# Patient Record
Sex: FEMALE | Race: WHITE | Employment: FULL TIME | URBAN - NONMETROPOLITAN AREA
[De-identification: names, ages, dates, MRNs, and addresses within clinical notes are randomized per-mention and may not be internally consistent; named-entity substitution may affect disease eponyms.]

---

## 2018-12-11 ENCOUNTER — NURSE ONLY (OUTPATIENT)
Dept: LAB | Age: 33
End: 2018-12-11
Payer: COMMERCIAL

## 2018-12-11 DIAGNOSIS — Z23 NEED FOR VACCINATION: Primary | ICD-10-CM

## 2018-12-11 PROCEDURE — 90471 IMMUNIZATION ADMIN: CPT | Performed by: FAMILY MEDICINE

## 2018-12-11 PROCEDURE — 90686 IIV4 VACC NO PRSV 0.5 ML IM: CPT | Performed by: FAMILY MEDICINE

## 2018-12-11 NOTE — PROGRESS NOTES
Have you had an allergic reaction to the flu (influenza) shot? no  Are you allergic to eggs or any component of the flu vaccine? no  Do you have a history of Guillain-Barnard Syndrome (GBS), which is paralysis after receiving the flu vaccine? no  Are you feeling well today? yes  Flu vaccine given as ordered. Patient tolerated it well. No questions re: VIS information.

## 2024-11-22 NOTE — PROGRESS NOTES
Medications listed   Nutroful 1x daily  Magnesium break through 500mg 1x daily  B6 2mg 1x daily  Manganese 1mg 1x daily

## 2025-01-08 ENCOUNTER — OFFICE VISIT (OUTPATIENT)
Age: 40
End: 2025-01-08

## 2025-01-08 VITALS
OXYGEN SATURATION: 99 % | DIASTOLIC BLOOD PRESSURE: 63 MMHG | BODY MASS INDEX: 20.83 KG/M2 | WEIGHT: 125 LBS | HEIGHT: 65 IN | HEART RATE: 70 BPM | SYSTOLIC BLOOD PRESSURE: 116 MMHG

## 2025-01-08 DIAGNOSIS — N39.46 URINARY INCONTINENCE, MIXED: ICD-10-CM

## 2025-01-08 DIAGNOSIS — R32 URINARY INCONTINENCE, UNSPECIFIED TYPE: Primary | ICD-10-CM

## 2025-01-08 DIAGNOSIS — R33.9 RETENTION OF URINE: ICD-10-CM

## 2025-01-08 DIAGNOSIS — R15.1 FECAL SMEARING: ICD-10-CM

## 2025-01-08 LAB
BILIRUBIN, POC: NORMAL
BLOOD URINE, POC: NORMAL
CLARITY, POC: NORMAL
COLOR, POC: NORMAL
GLUCOSE URINE, POC: NORMAL MG/DL
KETONES, POC: NORMAL MG/DL
LEUKOCYTE EST, POC: NORMAL
NITRITE, POC: NORMAL
PH, POC: 7
PROTEIN, POC: NORMAL MG/DL
SPECIFIC GRAVITY, POC: 1.01
UROBILINOGEN, POC: NORMAL MG/DL

## 2025-01-08 RX ORDER — UBIDECARENONE 75 MG
50 CAPSULE ORAL DAILY
COMMUNITY

## 2025-01-08 NOTE — PROGRESS NOTES
How long have you had urinary incontinence: 7 year(s)    Do you wear pads: yes  Pad Size: large  How often do you have to change the pad: 2-4    Are you having incontinence w/:cough, laugh, sneeze, run, jump  Are you having incontinence with urge:yes  Have you ever done PFT: yes    Any H/O of UTI's: no  If yes, were there cultures done: no    Have you ever had any previous bladder surgeries:no   
necessary.      ROS:  Review of Systems   Genitourinary:  Positive for frequency and urgency.        Mixed UI       PHYSICAL EXAM:  /63 (Site: Right Upper Arm, Position: Sitting, Cuff Size: Medium Adult)   Pulse 70   Ht 1.651 m (5' 5\")   Wt 56.7 kg (125 lb)   SpO2 99%   BMI 20.80 kg/m²     Physical Exam  Constitutional:       Appearance: Normal appearance. She is normal weight.   Genitourinary:      Bladder and urethral meatus normal.      No lesions in the vagina.      Right Labia: No rash, tenderness, lesions or skin changes.     Left Labia: No tenderness, lesions, skin changes or rash.     Vaginal cuff intact.     No vaginal tenderness.      No vaginal prolapse present.     No vaginal atrophy present.       Right Adnexa: not tender and no mass present.     Left Adnexa: not tender and no mass present.     No cervical lesion.      Uterus is not enlarged or tender.      No uterine mass detected.     No urethral tenderness or mass present.      Pelvic Floor: Levator muscle strength is 1/5.     Pelvic Floor comments: Pelvic cavity: Transvaginal digital palpation and Kegel's squeeze palpable.  Myalgia and myositis of the pelvic floor not present to contra-indicate a procedure.  No significant complications or exposures of any prior placed mesh.  No significant pelvic organ prolapse contraindicating a procedure..  Rectum:      Abnormal anal tone present.      No rectal mass.   HENT:      Head: Normocephalic and atraumatic.      Nose: Nose normal.      Mouth/Throat:      Mouth: Mucous membranes are moist.      Pharynx: Oropharynx is clear.   Eyes:      Extraocular Movements: Extraocular movements intact.      Conjunctiva/sclera: Conjunctivae normal.   Cardiovascular:      Rate and Rhythm: Normal rate and regular rhythm.   Pulmonary:      Effort: Pulmonary effort is normal.      Breath sounds: Normal breath sounds.   Abdominal:      General: Abdomen is flat. Bowel sounds are normal.      Hernia: There is no

## 2025-01-08 NOTE — PATIENT INSTRUCTIONS
incontinence.    Many women with urinary incontinence can decrease urinary leakage by exercising their pelvic floor muscles (the area around the vagina, urinary opening, and rectum). This exercise, commonly known as Kegel exercise, involves prince or squeezing and then releasing the pelvic floor muscles in order to strengthen them.    Muscles to Exercise    Before doing pelvic floor muscle strengthening exercises, you need to identify your pelvic floor muscles.  Your healthcare provider can help you identify the correct muscles to contract.  At home, you can identify these muscles by imagining that you are trying to stop from passing gas.  Tighten (squeeze and lift) the muscles you would use.  You should feel a “squeezing” or “closing” sensation at the bladder and rectal outlets as well as around the vagina.  You should also feel a sensation of “lifting” or “drawing in” through the pelvic floor - LIKE SIPPING A STRAW UPWARD.  You should not feel any movement in your abdomen, legs, or buttocks.  Completely relax the muscles between each tightening.  You should feel “releasing” or “letting go” sensation of the muscles you just tightened.  The muscles should feel like they dropped naturally.  Do not push the muscles.    Exercise Instructions    1. Initially, empty your bladder, and then find a quiet place to do your exercises.  This will help you to focus on doing the exercise correctly.  Once you are familiar with these exercises, you will not need to empty your bladder unless it makes your feel more comfortable, and you may do your exercises anywhere.  If you are doing the exercise correctly, no one can see you exercising these internal muscles.    2. Get into a comfortable seated, standing or lying positions as directed by your healthcare provider.  Tighten your pelvic floor muscles.    3. Continue to breathe regularly; do not hold your breath.    4. Hold the contraction strong and steady for